# Patient Record
Sex: FEMALE | URBAN - METROPOLITAN AREA
[De-identification: names, ages, dates, MRNs, and addresses within clinical notes are randomized per-mention and may not be internally consistent; named-entity substitution may affect disease eponyms.]

---

## 2020-03-12 ENCOUNTER — TRANSFERRED RECORDS (OUTPATIENT)
Dept: HEALTH INFORMATION MANAGEMENT | Facility: CLINIC | Age: 35
End: 2020-03-12

## 2020-03-13 NOTE — TELEPHONE ENCOUNTER
FUTURE VISIT INFORMATION      FUTURE VISIT INFORMATION:    Date: 4/8/20    Time: 7:30am    Location: [facility]  REFERRAL INFORMATION:    Referring provider:  Dr.Sarah Del Angel    Referring providers clinic:  [facility]    Reason for visit/diagnosis  Right eye optic neuritis     RECORDS REQUESTED FROM:       Clinic name Comments Records Status Imaging Status   [facility] Request for recs sent 3/12- received and sent to scanning 3/13 [facility]

## 2020-03-30 ENCOUNTER — TELEPHONE (OUTPATIENT)
Dept: OPHTHALMOLOGY | Facility: CLINIC | Age: 35
End: 2020-03-30

## 2020-03-30 NOTE — TELEPHONE ENCOUNTER
Addendum:  Patient called me back and we discussed that she has had two more episodes similar to before and consistent with migraine. Patient wishes to cancel visit with Dr. Escamilla and follow up with Dr. Del Angel at this time.    Curt Torrez MD  Ophthalmology, PGY-5  Neuro-Ophthalmology Fellow      I called the patient to discuss her upcoming visit, but there was no answer so I left a VM. VM detailed that if her vision is worse than to call me to discuss this. If her vision is still the same or better and if she has not had more episodes with flashing lights and zig zags associated with headache that we can cancel her appointment and have her reschedule at a later date.    Curt Torrez MD  Ophthalmology, PGY-5  Neuro-Ophthalmology Fellow

## 2020-04-08 ENCOUNTER — PRE VISIT (OUTPATIENT)
Dept: OPHTHALMOLOGY | Facility: CLINIC | Age: 35
End: 2020-04-08